# Patient Record
Sex: FEMALE | Race: WHITE | NOT HISPANIC OR LATINO | URBAN - METROPOLITAN AREA
[De-identification: names, ages, dates, MRNs, and addresses within clinical notes are randomized per-mention and may not be internally consistent; named-entity substitution may affect disease eponyms.]

---

## 2023-03-17 LAB
ALANINE AMINOTRANSFERASE (SGPT) (U/L) IN SER/PLAS: 13 U/L (ref 7–45)
ALBUMIN (G/DL) IN SER/PLAS: 4.5 G/DL (ref 3.4–5)
ALKALINE PHOSPHATASE (U/L) IN SER/PLAS: 105 U/L (ref 33–110)
ANION GAP IN SER/PLAS: 13 MMOL/L (ref 10–20)
ASPARTATE AMINOTRANSFERASE (SGOT) (U/L) IN SER/PLAS: 19 U/L (ref 9–39)
BILIRUBIN TOTAL (MG/DL) IN SER/PLAS: 0.6 MG/DL (ref 0–1.2)
CALCIDIOL (25 OH VITAMIN D3) (NG/ML) IN SER/PLAS: 50 NG/ML
CALCIUM (MG/DL) IN SER/PLAS: 10.1 MG/DL (ref 8.6–10.3)
CARBON DIOXIDE, TOTAL (MMOL/L) IN SER/PLAS: 29 MMOL/L (ref 21–32)
CHLORIDE (MMOL/L) IN SER/PLAS: 101 MMOL/L (ref 98–107)
CHOLESTEROL (MG/DL) IN SER/PLAS: 184 MG/DL (ref 0–199)
CHOLESTEROL IN HDL (MG/DL) IN SER/PLAS: 61 MG/DL
CHOLESTEROL/HDL RATIO: 3
COBALAMIN (VITAMIN B12) (PG/ML) IN SER/PLAS: 1347 PG/ML (ref 211–911)
CREATININE (MG/DL) IN SER/PLAS: 1.05 MG/DL (ref 0.5–1.05)
ERYTHROCYTE DISTRIBUTION WIDTH (RATIO) BY AUTOMATED COUNT: 11.7 % (ref 11.5–14.5)
ERYTHROCYTE MEAN CORPUSCULAR HEMOGLOBIN CONCENTRATION (G/DL) BY AUTOMATED: 33.8 G/DL (ref 32–36)
ERYTHROCYTE MEAN CORPUSCULAR VOLUME (FL) BY AUTOMATED COUNT: 96 FL (ref 80–100)
ERYTHROCYTES (10*6/UL) IN BLOOD BY AUTOMATED COUNT: 4.49 X10E12/L (ref 4–5.2)
GFR FEMALE: 63 ML/MIN/1.73M2
GLUCOSE (MG/DL) IN SER/PLAS: 96 MG/DL (ref 74–99)
HEMATOCRIT (%) IN BLOOD BY AUTOMATED COUNT: 42.9 % (ref 36–46)
HEMOGLOBIN (G/DL) IN BLOOD: 14.5 G/DL (ref 12–16)
LDL: 110 MG/DL (ref 0–99)
LEUKOCYTES (10*3/UL) IN BLOOD BY AUTOMATED COUNT: 5.3 X10E9/L (ref 4.4–11.3)
PLATELETS (10*3/UL) IN BLOOD AUTOMATED COUNT: 379 X10E9/L (ref 150–450)
POTASSIUM (MMOL/L) IN SER/PLAS: 4.4 MMOL/L (ref 3.5–5.3)
PROTEIN TOTAL: 7.4 G/DL (ref 6.4–8.2)
SODIUM (MMOL/L) IN SER/PLAS: 139 MMOL/L (ref 136–145)
TRIGLYCERIDE (MG/DL) IN SER/PLAS: 64 MG/DL (ref 0–149)
UREA NITROGEN (MG/DL) IN SER/PLAS: 18 MG/DL (ref 6–23)
VLDL: 13 MG/DL (ref 0–40)

## 2023-03-23 ENCOUNTER — TELEPHONE (OUTPATIENT)
Dept: PRIMARY CARE | Facility: CLINIC | Age: 53
End: 2023-03-23
Payer: COMMERCIAL

## 2023-03-23 NOTE — TELEPHONE ENCOUNTER
Pt was given results and verbalized understanding.     She questions why her b12 is so high, she has  not taken any supplements and she does not eat meat.

## 2023-03-23 NOTE — TELEPHONE ENCOUNTER
----- Message from Pito Lindsay DO sent at 3/21/2023  7:51 AM EDT -----  Please notify patient of high B12 of 1347!  Normal high is 911.  Would recommend stopping any B12 supplement for now.  All the rest of the labs are good.  Recheck in 1 year.    ----- Message -----  From: [facility] - Lab Results In  Sent: 3/17/2023   4:48 PM EDT  To: Pito Lindsay DO

## 2023-03-24 NOTE — TELEPHONE ENCOUNTER
I spoke with patient this afternoon and it turns out that she was taking B12 supplements 10,000 units SL daily.  She will stop these for now and we will see how she does.

## 2023-04-21 DIAGNOSIS — F17.210 CIGARETTE SMOKER: Primary | ICD-10-CM

## 2023-04-24 PROBLEM — F17.210 CIGARETTE SMOKER: Status: ACTIVE | Noted: 2023-04-24

## 2023-04-24 PROBLEM — R92.8 ABNORMALITY OF LEFT BREAST ON SCREENING MAMMOGRAM: Status: ACTIVE | Noted: 2023-04-24

## 2023-04-24 PROBLEM — Z59.89 DOES NOT HAVE HEALTH INSURANCE: Status: ACTIVE | Noted: 2023-04-24

## 2023-04-24 PROBLEM — J02.9 SORE THROAT: Status: ACTIVE | Noted: 2023-04-24

## 2023-04-24 PROBLEM — F32.9 MAJOR DEPRESSION: Status: ACTIVE | Noted: 2023-04-24

## 2023-04-24 PROBLEM — R41.3 MEMORY LOSS: Status: ACTIVE | Noted: 2023-04-24

## 2023-04-24 PROBLEM — L40.9 PSORIASIS: Status: ACTIVE | Noted: 2023-04-24

## 2023-04-24 PROBLEM — E53.8 VITAMIN B12 DEFICIENCY: Status: ACTIVE | Noted: 2023-04-24

## 2023-04-24 PROBLEM — Z59.71 DOES NOT HAVE HEALTH INSURANCE: Status: ACTIVE | Noted: 2023-04-24

## 2023-04-24 RX ORDER — BUPROPION HYDROCHLORIDE 150 MG/1
1 TABLET, EXTENDED RELEASE ORAL 2 TIMES DAILY
COMMUNITY
Start: 2020-07-23 | End: 2023-04-24 | Stop reason: SDUPTHER

## 2023-04-24 RX ORDER — APREMILAST 30 MG/1
1 TABLET, FILM COATED ORAL 2 TIMES DAILY
COMMUNITY

## 2023-04-24 RX ORDER — LANOLIN ALCOHOL/MO/W.PET/CERES
CREAM (GRAM) TOPICAL
COMMUNITY
End: 2023-11-17 | Stop reason: ALTCHOICE

## 2023-04-24 RX ORDER — CHOLECALCIFEROL (VITAMIN D3) 25 MCG
1 TABLET ORAL DAILY
COMMUNITY
End: 2023-11-17 | Stop reason: ALTCHOICE

## 2023-04-24 NOTE — TELEPHONE ENCOUNTER
Requested Prescriptions     Pending Prescriptions Disp Refills    buPROPion SR (Wellbutrin SR) 150 mg 12 hr tablet 180 tablet 0     Sig: Take 1 tablet (150 mg) by mouth in the morning and 1 tablet (150 mg) before bedtime.

## 2023-04-25 RX ORDER — BUPROPION HYDROCHLORIDE 150 MG/1
150 TABLET, EXTENDED RELEASE ORAL 2 TIMES DAILY
Qty: 180 TABLET | Refills: 0 | Status: SHIPPED | OUTPATIENT
Start: 2023-04-25 | End: 2023-05-12 | Stop reason: SDUPTHER

## 2023-05-12 ENCOUNTER — OFFICE VISIT (OUTPATIENT)
Dept: PRIMARY CARE | Facility: CLINIC | Age: 53
End: 2023-05-12
Payer: COMMERCIAL

## 2023-05-12 VITALS
OXYGEN SATURATION: 97 % | BODY MASS INDEX: 23.24 KG/M2 | DIASTOLIC BLOOD PRESSURE: 89 MMHG | WEIGHT: 123 LBS | TEMPERATURE: 98 F | HEART RATE: 95 BPM | RESPIRATION RATE: 16 BRPM | SYSTOLIC BLOOD PRESSURE: 129 MMHG

## 2023-05-12 DIAGNOSIS — Z12.11 SCREENING FOR COLON CANCER: Primary | ICD-10-CM

## 2023-05-12 DIAGNOSIS — F17.210 CIGARETTE SMOKER: ICD-10-CM

## 2023-05-12 PROCEDURE — 99214 OFFICE O/P EST MOD 30 MIN: CPT | Performed by: FAMILY MEDICINE

## 2023-05-12 RX ORDER — BUPROPION HYDROCHLORIDE 150 MG/1
150 TABLET, EXTENDED RELEASE ORAL 2 TIMES DAILY
Qty: 180 TABLET | Refills: 1 | Status: SHIPPED | OUTPATIENT
Start: 2023-05-12 | End: 2023-11-17 | Stop reason: SDUPTHER

## 2023-05-12 NOTE — PROGRESS NOTES
Subjective   Patient ID: Dulce Maria Beltre is a 53 y.o. female who presents for Med Refill (No complaints).    HPI   Patient comes in today for checkup and follow-up on her medications.  She was in the ER on 4/30/2023 complaining of back pain.  She also has some bloating, lightheadedness and weakness.  She had lab work, chest x-ray and CT of the abdomen and pelvis all of which was negative.  She was discharged home and instructed to follow-up here.  Since she was in the ER her symptoms have improved.  She currently is without complaints.        2/24/2023  Patient comes in today for checkup and refill of medication. She currently is without complaints. She is taking her medicines as directed and is not having any side effects from them.    4/28/2022  Patient presents today for 6-month checkup and refill of meds. She states that she is taking her medicines as directed and is not having any side effects from them. She is currently complaining of memory loss. 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 She does not understand why. She claims that she puts them in but they are not showing up. They did get new software at the dental office but she is not sure if it is her air or the software error. She is concerned however because her employer brought it to her attention.    10/25/2021  Patient presents today for 6-month checkup and refill of meds. She currently is without complaints. She states that she is taking her antidepressant medicines as directed and is not having any side effects from them. She is currently working and hopes to have health insurance by December.    4/16/2021  Patient is seen today as a telemedicine visit rendered via realtime interactive audio/video [employer] services as we are currently in the middle of a coronavirus pandemic worldwide. The patient was informed about the telehealth clinical encounter including  benefits to avoiding travel and limitations to the assessment. In person care may be recommended if needed. Telehealth sessions are not being recorded and personal health information is protected.    Patient presents today with a sore throat that she has had for the past 3 days. Nothing seems to make it better and nothing seems to make it worse. She also has a swollen gland on the right side of her neck. She denies any other symptoms. She denies fever, cough, sinus congestion or drainage, fatigue, muscle aches or pains, diarrhea etc.    Patient also needs a refill of her Wellbutrin medication. It is working very well for her not only for the depression but also she has almost stopped smoking and is down to 1 to 2 cigarettes a day.    Also she has been told by her work that she needs to have a hepatitis B titer drawn to find out if she is still immune. Order will be placed in the system.    2020  Patient presents today for checkup and refill of meds. She currently is without complaints. She states that she is taking her medicines as directed and is not having any side effects from them. The Wellbutrin is working well at its current dosage. She would like to continue.     2020  Patient comes in today as a returning patient to the practice. She is returning after getting a divorce and moving to [place] about 10 years ago. Her 18-year-old son was killed in an MVA 2019. He was the  of the car and his passenger survived. Patient has 2 daughters as well. Both of her parents have  in the last few years. She just moved back up to the area and currently has no health insurance. She is still grieving and would like to get on an antidepressant. She has been on Wellbutrin in the past 150 mg and felt it worked well for her without side effects. She would like to try it again.    Review of Systems   All other systems reviewed and are negative.      Objective   /89   Pulse 95   Temp 36.7 °C (98  °F)   Resp 16   Wt 55.8 kg (123 lb)   LMP  (LMP Unknown)   SpO2 97%   BMI 23.24 kg/m²     Physical Exam  Vitals reviewed.   Constitutional:       Appearance: She is well-developed.   HENT:      Head: Normocephalic and atraumatic.      Right Ear: Tympanic membrane, ear canal and external ear normal.      Left Ear: Tympanic membrane, ear canal and external ear normal.      Nose: Nose normal.      Mouth/Throat:      Mouth: Mucous membranes are moist.      Pharynx: Oropharynx is clear.   Eyes:      Extraocular Movements: Extraocular movements intact.      Conjunctiva/sclera: Conjunctivae normal.      Pupils: Pupils are equal, round, and reactive to light.   Cardiovascular:      Rate and Rhythm: Normal rate and regular rhythm.      Heart sounds: Normal heart sounds. No murmur heard.  Pulmonary:      Effort: Pulmonary effort is normal.      Breath sounds: Normal breath sounds. No wheezing.   Abdominal:      General: Abdomen is flat. Bowel sounds are normal.      Palpations: Abdomen is soft.   Musculoskeletal:         General: Normal range of motion.   Skin:     General: Skin is warm and dry.   Neurological:      General: No focal deficit present.      Mental Status: She is alert and oriented to person, place, and time. Mental status is at baseline.   Psychiatric:         Mood and Affect: Mood normal.         Behavior: Behavior normal.         Thought Content: Thought content normal.         Judgment: Judgment normal.         Assessment/Plan   Problem List Items Addressed This Visit       Cigarette smoker    Relevant Medications    buPROPion SR (Wellbutrin SR) 150 mg 12 hr tablet     Other Visit Diagnoses       Screening for colon cancer    -  Primary    Relevant Orders    Cologuard® colon cancer screening        ER notes reviewed during today's visit.  Continue current meds as directed.  Follow up in 6 months for recheck if all remains stable, sooner if problems arise.  Will contact patient with lab results when  available.  Medication list reconciled.  Thank you for visiting today!      Professional services: Some of this note was completed using Dragon voice technology and sometimes the software misinterprets words. This may include unintended errors with respect to translation of words, typographical errors or grammar errors which may not have been identified prior to finalization of the chart note. Please take this into account when reading the note. Thank you.

## 2023-09-05 DIAGNOSIS — F32.9 MAJOR DEPRESSIVE DISORDER, REMISSION STATUS UNSPECIFIED, UNSPECIFIED WHETHER RECURRENT: Primary | ICD-10-CM

## 2023-09-05 RX ORDER — BUPROPION HYDROCHLORIDE 150 MG/1
150 TABLET, FILM COATED, EXTENDED RELEASE ORAL 2 TIMES DAILY
Qty: 180 TABLET | Refills: 1 | Status: SHIPPED | OUTPATIENT
Start: 2023-09-05 | End: 2023-09-06 | Stop reason: SDUPTHER

## 2023-09-05 NOTE — TELEPHONE ENCOUNTER
Requested Prescriptions     Pending Prescriptions Disp Refills    buPROPion (Zyban) 150 mg 12 hr tablet [Pharmacy Med Name: BUPROPION HCL  MG TABLET] 180 tablet 1     Sig: Take 1 tablet (150 mg) by mouth 2 times a day.

## 2023-09-06 DIAGNOSIS — F32.9 MAJOR DEPRESSIVE DISORDER, REMISSION STATUS UNSPECIFIED, UNSPECIFIED WHETHER RECURRENT: ICD-10-CM

## 2023-09-06 RX ORDER — BUPROPION HYDROCHLORIDE 150 MG/1
150 TABLET, FILM COATED, EXTENDED RELEASE ORAL 2 TIMES DAILY
Qty: 180 TABLET | Refills: 1 | Status: SHIPPED | OUTPATIENT
Start: 2023-09-06 | End: 2023-11-17 | Stop reason: SDUPTHER

## 2023-10-27 PROBLEM — N95.0 POSTMENOPAUSAL BLEEDING: Status: RESOLVED | Noted: 2019-02-01 | Resolved: 2023-10-27

## 2023-10-27 PROBLEM — L40.3 PALMOPLANTAR PUSTULAR PSORIASIS: Status: RESOLVED | Noted: 2019-02-01 | Resolved: 2023-10-27

## 2023-10-27 PROBLEM — R10.9 FLANK PAIN: Status: RESOLVED | Noted: 2023-10-27 | Resolved: 2023-10-27

## 2023-10-27 PROBLEM — M79.672 ACUTE PAIN OF LEFT FOOT: Status: RESOLVED | Noted: 2022-07-07 | Resolved: 2023-10-27

## 2023-11-09 ENCOUNTER — HOSPITAL ENCOUNTER (EMERGENCY)
Facility: HOSPITAL | Age: 53
Discharge: HOME | End: 2023-11-09
Payer: COMMERCIAL

## 2023-11-09 VITALS
SYSTOLIC BLOOD PRESSURE: 144 MMHG | WEIGHT: 127 LBS | HEART RATE: 77 BPM | DIASTOLIC BLOOD PRESSURE: 96 MMHG | BODY MASS INDEX: 23.98 KG/M2 | TEMPERATURE: 97.5 F | RESPIRATION RATE: 18 BRPM | OXYGEN SATURATION: 98 % | HEIGHT: 61 IN

## 2023-11-09 DIAGNOSIS — L03.119 CELLULITIS OF UPPER EXTREMITY, UNSPECIFIED LATERALITY: ICD-10-CM

## 2023-11-09 DIAGNOSIS — L40.9 PSORIASIS: Primary | ICD-10-CM

## 2023-11-09 PROCEDURE — 99284 EMERGENCY DEPT VISIT MOD MDM: CPT | Performed by: PHYSICIAN ASSISTANT

## 2023-11-09 PROCEDURE — 99283 EMERGENCY DEPT VISIT LOW MDM: CPT

## 2023-11-09 PROCEDURE — 99285 EMERGENCY DEPT VISIT HI MDM: CPT

## 2023-11-09 RX ORDER — DOXYCYCLINE 100 MG/1
100 CAPSULE ORAL 2 TIMES DAILY
Qty: 20 CAPSULE | Refills: 0 | Status: SHIPPED | OUTPATIENT
Start: 2023-11-09 | End: 2023-11-19

## 2023-11-09 ASSESSMENT — COLUMBIA-SUICIDE SEVERITY RATING SCALE - C-SSRS
1. IN THE PAST MONTH, HAVE YOU WISHED YOU WERE DEAD OR WISHED YOU COULD GO TO SLEEP AND NOT WAKE UP?: NO
6. HAVE YOU EVER DONE ANYTHING, STARTED TO DO ANYTHING, OR PREPARED TO DO ANYTHING TO END YOUR LIFE?: NO
2. HAVE YOU ACTUALLY HAD ANY THOUGHTS OF KILLING YOURSELF?: NO

## 2023-11-09 ASSESSMENT — PAIN SCALES - GENERAL: PAINLEVEL_OUTOF10: 4

## 2023-11-09 ASSESSMENT — PAIN DESCRIPTION - PAIN TYPE: TYPE: ACUTE PAIN

## 2023-11-09 ASSESSMENT — PAIN - FUNCTIONAL ASSESSMENT: PAIN_FUNCTIONAL_ASSESSMENT: 0-10

## 2023-11-09 ASSESSMENT — PAIN DESCRIPTION - LOCATION: LOCATION: HAND

## 2023-11-09 NOTE — ED PROVIDER NOTES
HPI   Chief Complaint   Patient presents with    Wound Check     BL palms. Pt reports thinks she has staph. Reports hx of psorisis.          Patient is a 53-year-old female who presents today for evaluation of a rash, she has a history of psoriasis, states in the past when her rash started out like this and actually spread up her arms very quickly and they actually thought she had Lyons-Jeison's, it ended up being a MRSA infection.  Patient states that she first noticed a spot on her hand yesterday, she states has been scratching it because it is so itchy but states that today she started noticing another spot on her right hand and gradually spreading redness.  She states it is now becoming painful which is why she believes it is infected.  Denies any fevers, chills, states she wanted to catch it early before it developed into the severe infection it was last time.  States that the time that this is happened she was prescribed doxycycline which always helps.                          No data recorded                Patient History   Past Medical History:   Diagnosis Date    Acute pain of left foot 2022    Flank pain 10/27/2023    Palmoplantar pustular psoriasis 2019    Personal history of other mental and behavioral disorders     History of depression    Postmenopausal bleeding 2019     Past Surgical History:   Procedure Laterality Date    OTHER SURGICAL HISTORY  2020     section     No family history on file.  Social History     Tobacco Use    Smoking status: Former     Types: Cigarettes     Passive exposure: Past    Smokeless tobacco: Never   Substance Use Topics    Alcohol use: Not Currently    Drug use: Not on file       Physical Exam   ED Triage Vitals [23 1024]   Temp Heart Rate Resp BP   36.4 °C (97.5 °F) 77 18 (!) 144/96      SpO2 Temp Source Heart Rate Source Patient Position   98 % Temporal Monitor Sitting      BP Location FiO2 (%)     Left arm --       Physical  Exam  Vitals and nursing note reviewed.   Constitutional:       General: She is not in acute distress.     Appearance: Normal appearance. She is not toxic-appearing.   HENT:      Head: Normocephalic and atraumatic.      Nose: Nose normal.   Eyes:      Extraocular Movements: Extraocular movements intact.   Cardiovascular:      Rate and Rhythm: Normal rate and regular rhythm.   Pulmonary:      Effort: Pulmonary effort is normal.   Abdominal:      Palpations: Abdomen is soft.   Musculoskeletal:         General: Normal range of motion.      Cervical back: Normal range of motion and neck supple.   Skin:     General: Skin is warm and dry.      Comments:   Dry skin noted to bilateral palms, patient with multiple papules to right hand and left with surrounding erythema consistent with cellulitis.  No disclamation of the skin or Nikolsky sign.     Neurological:      General: No focal deficit present.      Mental Status: She is alert.   Psychiatric:         Mood and Affect: Mood normal.         Thought Content: Thought content normal.         ED Course & MDM   Diagnoses as of 11/09/23 1307   Psoriasis   Cellulitis of upper extremity, unspecified laterality       Medical Decision Making    MDM: Patient is a 53-year-old female who presents today for what she believes is a developing MRSA infection, she has had this in the past, in the past it is gotten very severe and so she likes to catch it early, she states that this is the same presentation she had last time, states that she benefits from doxycycline.  It is unusual that it is bilateral however given that she does have breaks in the skin from her psoriasis, very likely that she could have developed an infection I will treat her accordingly with doxycycline, she is encouraged to return with new or worsening symptoms, she has no systemic symptoms, no desquamation of the skin, no concern for any acute emergent process requiring further work-up at this  time.            Procedure  Procedures     Sondra Tavares PA-C  11/09/23 [id number]

## 2023-11-17 ENCOUNTER — OFFICE VISIT (OUTPATIENT)
Dept: PRIMARY CARE | Facility: CLINIC | Age: 53
End: 2023-11-17
Payer: COMMERCIAL

## 2023-11-17 VITALS
HEIGHT: 61 IN | WEIGHT: 126 LBS | DIASTOLIC BLOOD PRESSURE: 68 MMHG | TEMPERATURE: 97.2 F | HEART RATE: 96 BPM | BODY MASS INDEX: 23.79 KG/M2 | RESPIRATION RATE: 20 BRPM | SYSTOLIC BLOOD PRESSURE: 118 MMHG

## 2023-11-17 DIAGNOSIS — F32.9 MAJOR DEPRESSIVE DISORDER, REMISSION STATUS UNSPECIFIED, UNSPECIFIED WHETHER RECURRENT: Primary | ICD-10-CM

## 2023-11-17 PROCEDURE — 99213 OFFICE O/P EST LOW 20 MIN: CPT | Performed by: FAMILY MEDICINE

## 2023-11-17 RX ORDER — BUPROPION HYDROCHLORIDE 150 MG/1
150 TABLET, EXTENDED RELEASE ORAL 2 TIMES DAILY
Qty: 60 TABLET | Refills: 5 | Status: SHIPPED | OUTPATIENT
Start: 2023-11-17 | End: 2024-05-30

## 2023-11-17 RX ORDER — BUPROPION HYDROCHLORIDE 150 MG/1
150 TABLET, EXTENDED RELEASE ORAL 2 TIMES DAILY
Qty: 180 TABLET | Refills: 1 | Status: SHIPPED | OUTPATIENT
Start: 2023-11-17 | End: 2023-11-17 | Stop reason: SDUPTHER

## 2023-11-17 ASSESSMENT — ENCOUNTER SYMPTOMS
SLEEP DISTURBANCE: 1
NERVOUS/ANXIOUS: 1
FEVER: 0
FATIGUE: 0
NAUSEA: 0
VOMITING: 0
COUGH: 0
DYSPHORIC MOOD: 1
SHORTNESS OF BREATH: 0
WHEEZING: 0
DIARRHEA: 0

## 2023-11-17 NOTE — ASSESSMENT & PLAN NOTE
Pt on bupropion , works well without side effects  Has been stable  Rf med today  F/up 6 mo with pcp

## 2023-11-17 NOTE — PROGRESS NOTES
"Subjective   Patient ID: Dulce Maria Beltre is a 53 y.o. female who presents for Follow-up (6 month med check (MJ pt) Buproption RF pending for signature to be sent. ).    HPI     Review of Systems   Constitutional:  Negative for fatigue and fever.   Respiratory:  Negative for cough, shortness of breath and wheezing.         Pt quit smoking last yr   Gastrointestinal:  Negative for diarrhea, nausea and vomiting.   Psychiatric/Behavioral:  Positive for dysphoric mood and sleep disturbance. Negative for suicidal ideas. The patient is nervous/anxious.         Pt on bupropion for depression now. works well without side effects. Depression has been stable       Objective   /68   Pulse 96   Temp 36.2 °C (97.2 °F)   Resp 20   Ht 1.549 m (5' 1\")   Wt 57.2 kg (126 lb)   LMP  (LMP Unknown)   BMI 23.81 kg/m²     Physical Exam  Vitals and nursing note reviewed.   Constitutional:       General: She is not in acute distress.     Appearance: Normal appearance.   HENT:      Head: Normocephalic and atraumatic.   Cardiovascular:      Rate and Rhythm: Normal rate and regular rhythm.      Heart sounds: Normal heart sounds.   Pulmonary:      Effort: Pulmonary effort is normal.      Breath sounds: Normal breath sounds.   Neurological:      Mental Status: She is alert.   Psychiatric:         Mood and Affect: Mood normal.         Behavior: Behavior normal.         Assessment/Plan   Problem List Items Addressed This Visit             ICD-10-CM    Major depression - Primary F32.9     Pt on bupropion , works well without side effects  Has been stable  Rf med today  F/up 6 mo with pcp         Relevant Medications    buPROPion SR (Wellbutrin SR) 150 mg 12 hr tablet          "

## 2024-05-02 ENCOUNTER — APPOINTMENT (OUTPATIENT)
Dept: OBSTETRICS AND GYNECOLOGY | Facility: CLINIC | Age: 54
End: 2024-05-02
Payer: COMMERCIAL

## 2024-05-10 ENCOUNTER — OFFICE VISIT (OUTPATIENT)
Dept: PRIMARY CARE | Facility: CLINIC | Age: 54
End: 2024-05-10
Payer: COMMERCIAL

## 2024-05-10 ENCOUNTER — LAB (OUTPATIENT)
Dept: LAB | Facility: LAB | Age: 54
End: 2024-05-10
Payer: COMMERCIAL

## 2024-05-10 VITALS
SYSTOLIC BLOOD PRESSURE: 116 MMHG | HEART RATE: 87 BPM | BODY MASS INDEX: 23.81 KG/M2 | RESPIRATION RATE: 16 BRPM | DIASTOLIC BLOOD PRESSURE: 86 MMHG | OXYGEN SATURATION: 96 % | WEIGHT: 126 LBS | TEMPERATURE: 97.3 F

## 2024-05-10 DIAGNOSIS — E53.8 VITAMIN B12 DEFICIENCY: ICD-10-CM

## 2024-05-10 DIAGNOSIS — Z13.220 LIPID SCREENING: ICD-10-CM

## 2024-05-10 DIAGNOSIS — Z00.00 ROUTINE GENERAL MEDICAL EXAMINATION AT A HEALTH CARE FACILITY: ICD-10-CM

## 2024-05-10 DIAGNOSIS — Z00.00 ROUTINE GENERAL MEDICAL EXAMINATION AT A HEALTH CARE FACILITY: Primary | ICD-10-CM

## 2024-05-10 DIAGNOSIS — E55.9 VITAMIN D DEFICIENCY: ICD-10-CM

## 2024-05-10 DIAGNOSIS — Z83.49 FAMILY HISTORY OF THYROID DISEASE: ICD-10-CM

## 2024-05-10 DIAGNOSIS — D64.9 ANEMIA, UNSPECIFIED TYPE: ICD-10-CM

## 2024-05-10 DIAGNOSIS — Z83.3 FAMILY HISTORY OF DIABETES MELLITUS (DM): ICD-10-CM

## 2024-05-10 LAB
25(OH)D3 SERPL-MCNC: 48 NG/ML (ref 30–100)
ALBUMIN SERPL BCP-MCNC: 4.4 G/DL (ref 3.4–5)
ALP SERPL-CCNC: 97 U/L (ref 33–110)
ALT SERPL W P-5'-P-CCNC: 13 U/L (ref 7–45)
ANION GAP SERPL CALC-SCNC: 11 MMOL/L (ref 10–20)
AST SERPL W P-5'-P-CCNC: 14 U/L (ref 9–39)
BILIRUB SERPL-MCNC: 0.6 MG/DL (ref 0–1.2)
BUN SERPL-MCNC: 14 MG/DL (ref 6–23)
CALCIUM SERPL-MCNC: 9.9 MG/DL (ref 8.6–10.3)
CHLORIDE SERPL-SCNC: 104 MMOL/L (ref 98–107)
CHOLEST SERPL-MCNC: 184 MG/DL (ref 0–199)
CHOLESTEROL/HDL RATIO: 2.9
CO2 SERPL-SCNC: 29 MMOL/L (ref 21–32)
CREAT SERPL-MCNC: 0.91 MG/DL (ref 0.5–1.05)
EGFRCR SERPLBLD CKD-EPI 2021: 75 ML/MIN/1.73M*2
ERYTHROCYTE [DISTWIDTH] IN BLOOD BY AUTOMATED COUNT: 11.9 % (ref 11.5–14.5)
EST. AVERAGE GLUCOSE BLD GHB EST-MCNC: 94 MG/DL
GLUCOSE SERPL-MCNC: 92 MG/DL (ref 74–99)
HBA1C MFR BLD: 4.9 %
HCT VFR BLD AUTO: 43.2 % (ref 36–46)
HDLC SERPL-MCNC: 64 MG/DL
HGB BLD-MCNC: 14.7 G/DL (ref 12–16)
IRON SATN MFR SERPL: 52 % (ref 25–45)
IRON SERPL-MCNC: 162 UG/DL (ref 35–150)
LDLC SERPL CALC-MCNC: 105 MG/DL
MCH RBC QN AUTO: 31.8 PG (ref 26–34)
MCHC RBC AUTO-ENTMCNC: 34 G/DL (ref 32–36)
MCV RBC AUTO: 94 FL (ref 80–100)
NON HDL CHOLESTEROL: 120 MG/DL (ref 0–149)
NRBC BLD-RTO: 0 /100 WBCS (ref 0–0)
PLATELET # BLD AUTO: 259 X10*3/UL (ref 150–450)
POTASSIUM SERPL-SCNC: 4.3 MMOL/L (ref 3.5–5.3)
PROT SERPL-MCNC: 6.9 G/DL (ref 6.4–8.2)
RBC # BLD AUTO: 4.62 X10*6/UL (ref 4–5.2)
SODIUM SERPL-SCNC: 140 MMOL/L (ref 136–145)
TIBC SERPL-MCNC: 313 UG/DL (ref 240–445)
TRIGL SERPL-MCNC: 73 MG/DL (ref 0–149)
TSH SERPL-ACNC: 1.04 MIU/L (ref 0.44–3.98)
UIBC SERPL-MCNC: 151 UG/DL (ref 110–370)
VIT B12 SERPL-MCNC: 316 PG/ML (ref 211–911)
VLDL: 15 MG/DL (ref 0–40)
WBC # BLD AUTO: 4.7 X10*3/UL (ref 4.4–11.3)

## 2024-05-10 PROCEDURE — 82306 VITAMIN D 25 HYDROXY: CPT

## 2024-05-10 PROCEDURE — 83036 HEMOGLOBIN GLYCOSYLATED A1C: CPT

## 2024-05-10 PROCEDURE — 80053 COMPREHEN METABOLIC PANEL: CPT

## 2024-05-10 PROCEDURE — 83540 ASSAY OF IRON: CPT

## 2024-05-10 PROCEDURE — 80061 LIPID PANEL: CPT

## 2024-05-10 PROCEDURE — 83550 IRON BINDING TEST: CPT

## 2024-05-10 PROCEDURE — 36415 COLL VENOUS BLD VENIPUNCTURE: CPT

## 2024-05-10 PROCEDURE — 84443 ASSAY THYROID STIM HORMONE: CPT

## 2024-05-10 PROCEDURE — 82607 VITAMIN B-12: CPT

## 2024-05-10 PROCEDURE — 99396 PREV VISIT EST AGE 40-64: CPT | Performed by: FAMILY MEDICINE

## 2024-05-10 PROCEDURE — 85027 COMPLETE CBC AUTOMATED: CPT

## 2024-05-10 NOTE — RESULT ENCOUNTER NOTE
David Hardy,    Your B12 level was low normal at 316.  Vitamin B-12 level should be above 400. If less than 400 can have both neurologic and/or psychological symptoms.  Would recommend over-the-counter vitamin B-12  1000 units daily to keep the level above 400. Recheck in September 2024.    All the rest your labs were great including your iron level which was actually on the high side.  So if you are taking iron pills you can stop them.    Happy Mother's Day!    Have a Great Day and Weekend!!!    Dr. Lindsay

## 2024-05-10 NOTE — PROGRESS NOTES
Subjective   Patient ID: Dulce Maria Beltre is a 54 y.o. female who presents for Follow-up (6 mo med fu. No questions, concerns, or complaints. /Fasting for labs, was due in March. /) and Throat Problem (Intermittent, when swallowing and her head is turned, she hears and feels a popping. Noticed a few weeks ago. ).  HPI  Patient comes in today for physical exam.  She currently is without complaints.    Review of Systems   All other systems reviewed and are negative.      Objective   Vitals:  /86   Pulse 87   Temp 36.3 °C (97.3 °F)   Resp 16   Wt 57.2 kg (126 lb)   LMP  (LMP Unknown)   SpO2 96%   BMI 23.81 kg/m²     Physical Exam  Vitals reviewed.   Constitutional:       Appearance: She is well-developed.   HENT:      Head: Normocephalic and atraumatic.      Right Ear: Tympanic membrane, ear canal and external ear normal.      Left Ear: Tympanic membrane, ear canal and external ear normal.      Nose: Nose normal.      Mouth/Throat:      Mouth: Mucous membranes are moist.      Pharynx: Oropharynx is clear.   Eyes:      Extraocular Movements: Extraocular movements intact.      Conjunctiva/sclera: Conjunctivae normal.      Pupils: Pupils are equal, round, and reactive to light.   Cardiovascular:      Rate and Rhythm: Normal rate and regular rhythm.      Heart sounds: Normal heart sounds. No murmur heard.  Pulmonary:      Effort: Pulmonary effort is normal.      Breath sounds: Normal breath sounds. No wheezing.   Abdominal:      General: Abdomen is flat. Bowel sounds are normal.      Palpations: Abdomen is soft.   Musculoskeletal:         General: Normal range of motion.   Skin:     General: Skin is warm and dry.   Neurological:      General: No focal deficit present.      Mental Status: She is alert and oriented to person, place, and time. Mental status is at baseline.   Psychiatric:         Mood and Affect: Mood normal.         Behavior: Behavior normal.         Thought Content: Thought content normal.          Judgment: Judgment normal.       Assessment/Plan   Problem List Items Addressed This Visit       Vitamin B12 deficiency    Relevant Orders    CBC (Completed)    Vitamin B12 (Completed)     Other Visit Diagnoses       Routine general medical examination at a health care facility    -  Primary    Relevant Orders    Comprehensive Metabolic Panel (Completed)    Family history of diabetes mellitus (DM)        Relevant Orders    Hemoglobin A1C (Completed)    Vitamin D deficiency        Relevant Orders    Vitamin D 25-Hydroxy,Total (for eval of Vitamin D levels) (Completed)    Lipid screening        Relevant Orders    Lipid Panel (Completed)    Family history of thyroid disease        Relevant Orders    TSH with reflex to Free T4 if abnormal (Completed)    Anemia, unspecified type        Relevant Orders    Iron and TIBC (Completed)        Will contact patient with lab results when available.  Patient to have the Cologuard test at her house last year but she found the process not to her liking and thus never did the test.  She will think about doing a colonoscopy.  She claims she is up-to-date with her Pap and mammograms.   Medication list reconciled.  Thank you for visiting today!      Professional services: Some of this note was completed using Dragon voice technology and sometimes the software misinterprets words. This may include unintended errors with respect to translation of words, typographical errors or grammar errors which may not have been identified prior to finalization of the chart note. Please take this into account when reading the note. Thank you.            Pito Lindsay DO 05/11/24 10:15 AM

## 2024-05-17 ENCOUNTER — TELEPHONE (OUTPATIENT)
Dept: PRIMARY CARE | Facility: CLINIC | Age: 54
End: 2024-05-17
Payer: COMMERCIAL

## 2024-05-17 DIAGNOSIS — R79.0 ABNORMAL SERUM IRON LEVEL: Primary | ICD-10-CM

## 2024-05-17 NOTE — TELEPHONE ENCOUNTER
We will refer patient to hematology for further evaluation of her iron.  I placed an order for the referral.  Thank you.

## 2024-05-17 NOTE — TELEPHONE ENCOUNTER
Pt states she does not eat red meat and does not take iron pills, she is unsure why else her iron level would be elevated.

## 2024-05-20 DIAGNOSIS — Z12.11 COLON CANCER SCREENING: ICD-10-CM

## 2024-05-24 PROBLEM — Z12.11 COLON CANCER SCREENING: Status: ACTIVE | Noted: 2024-05-24

## 2024-05-28 DIAGNOSIS — Z12.11 COLON CANCER SCREENING: ICD-10-CM

## 2024-05-30 ENCOUNTER — OFFICE VISIT (OUTPATIENT)
Dept: OBSTETRICS AND GYNECOLOGY | Facility: CLINIC | Age: 54
End: 2024-05-30
Payer: COMMERCIAL

## 2024-05-30 VITALS
SYSTOLIC BLOOD PRESSURE: 126 MMHG | DIASTOLIC BLOOD PRESSURE: 82 MMHG | HEIGHT: 61 IN | BODY MASS INDEX: 23.88 KG/M2 | WEIGHT: 126.5 LBS

## 2024-05-30 DIAGNOSIS — Z86.19 HISTORY OF HERPES SIMPLEX INFECTION: ICD-10-CM

## 2024-05-30 DIAGNOSIS — Z12.31 VISIT FOR SCREENING MAMMOGRAM: ICD-10-CM

## 2024-05-30 DIAGNOSIS — R92.333 HETEROGENEOUSLY DENSE TISSUE OF BOTH BREASTS ON MAMMOGRAPHY: ICD-10-CM

## 2024-05-30 DIAGNOSIS — Z01.419 WELL WOMAN EXAM WITH ROUTINE GYNECOLOGICAL EXAM: Primary | ICD-10-CM

## 2024-05-30 PROCEDURE — 99386 PREV VISIT NEW AGE 40-64: CPT

## 2024-05-30 PROCEDURE — 1036F TOBACCO NON-USER: CPT

## 2024-05-30 RX ORDER — ACYCLOVIR 50 MG/G
1 OINTMENT TOPICAL
COMMUNITY
End: 2024-05-30 | Stop reason: SDUPTHER

## 2024-05-30 RX ORDER — ACYCLOVIR 50 MG/G
1 OINTMENT TOPICAL
Qty: 30 G | Refills: 3 | Status: SHIPPED | OUTPATIENT
Start: 2024-05-30

## 2024-05-30 RX ORDER — SODIUM, POTASSIUM,MAG SULFATES 17.5-3.13G
SOLUTION, RECONSTITUTED, ORAL ORAL
Qty: 1 EACH | Refills: 0 | Status: SHIPPED | OUTPATIENT
Start: 2024-05-30

## 2024-05-30 RX ORDER — ERGOCALCIFEROL 1.25 MG/1
1.25 CAPSULE ORAL
COMMUNITY

## 2024-05-30 ASSESSMENT — ENCOUNTER SYMPTOMS
OCCASIONAL FEELINGS OF UNSTEADINESS: 0
LOSS OF SENSATION IN FEET: 0
DEPRESSION: 0

## 2024-05-30 ASSESSMENT — PATIENT HEALTH QUESTIONNAIRE - PHQ9
2. FEELING DOWN, DEPRESSED OR HOPELESS: NOT AT ALL
1. LITTLE INTEREST OR PLEASURE IN DOING THINGS: NOT AT ALL
SUM OF ALL RESPONSES TO PHQ9 QUESTIONS 1 & 2: 0

## 2024-05-30 NOTE — PROGRESS NOTES
"Subjective   Dulce Maria Beltre is a 54 y.o. female who is here for a NPV exam.     GYN & Sexual Hx.:   - Last pap: 2021 Normal, HPV neg.  - History of abnormal Pap smear: yes - Atypical cells, history of colposcopy () per patient, though normal and normal pap results to follow per patient.  - Contraception: None  - Menstrual Periods: No periods since 4821-7274.    Concern for vaginal dryness and decreased libido.  Interested in medication to help to increase her libido.     Hx of HSV, requests refill of acyclovir ointment.     OB Hx.:       Regular self breast exam: yes  Last mammogram: 2022 with U/S, Cat. 2.  Family history of breast cancer: no    Living situation: alone, Occupation:  Orthodontic assistant , Tobacco/alcohol/caffeine: No alcohol use, occasional nicotine use, though rare per patient, and Illicit drugs: no history of illicit drug use    Diet: general  Exercise: irregularly- walking, weights.    Review of Systems   All other systems reviewed and are negative.      Objective   /82   Ht 1.549 m (5' 1\")   Wt 57.4 kg (126 lb 8 oz)   LMP  (LMP Unknown)   BMI 23.90 kg/m²      General:   alert and oriented, in no acute distress           Lungs: Normal respiratory effort.   Breasts: Soft, symmetric, no skin dimpling or nipple discharge, no palpable masses or axillary nodes.   Abdomen: soft, non-tender, without masses or organomegaly   Vulva: normal, Bartholin's, Urethra, [place]'s normal   Vagina: Atrophy               Neuro: age-appropriate affect, behavior and speech, no gross motor deficits, normal gait     Assessment      54 y.o.  woman for annual GYN exam.     - Health Maintenance --> Routine follow up with PCP for health maintenance examination encouraged, including TSH, cholesterol, and Vit. D evaluation.  Self breast exam encouraged; concerning characteristics of breasts reviewed - Pt. will report general concerns, any adherent lumps, skin dimpling/puckering or color " changes, and any nipple discharge.  Order for mammogram placed and patient advised to schedule.  Diet and exercise reviewed.   Pap due 2026 - Reviewed ACOG and ASCCP guidelines with pt.      - Vaginal Dryness -- Discussed use of Replens vaginal moisturizor 3-4 x/ week.     - Decreased Libido -- Referral to [facility].     - F/U 1 year or as needed.    Olivia Diaz, OCTAVIO-ARONM

## 2024-06-11 ENCOUNTER — ANESTHESIA EVENT (OUTPATIENT)
Dept: GASTROENTEROLOGY | Facility: EXTERNAL LOCATION | Age: 54
End: 2024-06-11

## 2024-06-11 DIAGNOSIS — F32.9 MAJOR DEPRESSIVE DISORDER, REMISSION STATUS UNSPECIFIED, UNSPECIFIED WHETHER RECURRENT: ICD-10-CM

## 2024-06-11 RX ORDER — BUPROPION HYDROCHLORIDE 150 MG/1
150 TABLET, EXTENDED RELEASE ORAL 2 TIMES DAILY
Qty: 180 TABLET | Refills: 0 | Status: SHIPPED | OUTPATIENT
Start: 2024-06-11 | End: 2024-09-09

## 2024-06-14 ENCOUNTER — HOSPITAL ENCOUNTER (OUTPATIENT)
Dept: RADIOLOGY | Facility: CLINIC | Age: 54
Discharge: HOME | End: 2024-06-14
Payer: COMMERCIAL

## 2024-06-14 VITALS — BODY MASS INDEX: 23.79 KG/M2 | HEIGHT: 61 IN | WEIGHT: 126 LBS

## 2024-06-14 DIAGNOSIS — Z12.31 VISIT FOR SCREENING MAMMOGRAM: ICD-10-CM

## 2024-06-14 DIAGNOSIS — R92.333 HETEROGENEOUSLY DENSE TISSUE OF BOTH BREASTS ON MAMMOGRAPHY: ICD-10-CM

## 2024-06-14 PROCEDURE — 77063 BREAST TOMOSYNTHESIS BI: CPT

## 2024-06-21 ENCOUNTER — APPOINTMENT (OUTPATIENT)
Dept: GASTROENTEROLOGY | Facility: EXTERNAL LOCATION | Age: 54
End: 2024-06-21
Payer: COMMERCIAL

## 2024-06-21 ENCOUNTER — ANESTHESIA (OUTPATIENT)
Dept: GASTROENTEROLOGY | Facility: EXTERNAL LOCATION | Age: 54
End: 2024-06-21

## 2024-09-03 ENCOUNTER — OFFICE VISIT (OUTPATIENT)
Dept: HEMATOLOGY/ONCOLOGY | Facility: CLINIC | Age: 54
End: 2024-09-03
Payer: COMMERCIAL

## 2024-09-03 ENCOUNTER — LAB (OUTPATIENT)
Dept: LAB | Facility: CLINIC | Age: 54
End: 2024-09-03
Payer: COMMERCIAL

## 2024-09-03 VITALS
HEART RATE: 82 BPM | SYSTOLIC BLOOD PRESSURE: 131 MMHG | OXYGEN SATURATION: 97 % | RESPIRATION RATE: 16 BRPM | TEMPERATURE: 98.1 F | WEIGHT: 129.19 LBS | DIASTOLIC BLOOD PRESSURE: 86 MMHG | BODY MASS INDEX: 24.41 KG/M2

## 2024-09-03 DIAGNOSIS — R79.0 ABNORMAL SERUM IRON LEVEL: ICD-10-CM

## 2024-09-03 LAB
ALBUMIN SERPL BCP-MCNC: 4.5 G/DL (ref 3.4–5)
ALP SERPL-CCNC: 96 U/L (ref 33–110)
ALT SERPL W P-5'-P-CCNC: 14 U/L (ref 7–45)
ANION GAP SERPL CALC-SCNC: 11 MMOL/L (ref 10–20)
AST SERPL W P-5'-P-CCNC: 14 U/L (ref 9–39)
BASOPHILS # BLD AUTO: 0.03 X10*3/UL (ref 0–0.1)
BASOPHILS NFR BLD AUTO: 0.5 %
BILIRUB SERPL-MCNC: 0.3 MG/DL (ref 0–1.2)
BUN SERPL-MCNC: 13 MG/DL (ref 6–23)
CALCIUM SERPL-MCNC: 9.9 MG/DL (ref 8.6–10.3)
CHLORIDE SERPL-SCNC: 103 MMOL/L (ref 98–107)
CO2 SERPL-SCNC: 29 MMOL/L (ref 21–32)
CREAT SERPL-MCNC: 0.89 MG/DL (ref 0.5–1.05)
EGFRCR SERPLBLD CKD-EPI 2021: 77 ML/MIN/1.73M*2
EOSINOPHIL # BLD AUTO: 0.43 X10*3/UL (ref 0–0.7)
EOSINOPHIL NFR BLD AUTO: 6.6 %
ERYTHROCYTE [DISTWIDTH] IN BLOOD BY AUTOMATED COUNT: 11.7 % (ref 11.5–14.5)
FERRITIN SERPL-MCNC: 29 NG/ML (ref 8–150)
GLUCOSE SERPL-MCNC: 89 MG/DL (ref 74–99)
HCT VFR BLD AUTO: 42.3 % (ref 36–46)
HGB BLD-MCNC: 14.3 G/DL (ref 12–16)
IMM GRANULOCYTES # BLD AUTO: 0 X10*3/UL (ref 0–0.7)
IMM GRANULOCYTES NFR BLD AUTO: 0 % (ref 0–0.9)
IRON SATN MFR SERPL: 24 % (ref 25–45)
IRON SERPL-MCNC: 79 UG/DL (ref 35–150)
LYMPHOCYTES # BLD AUTO: 1.86 X10*3/UL (ref 1.2–4.8)
LYMPHOCYTES NFR BLD AUTO: 28.7 %
MCH RBC QN AUTO: 32.3 PG (ref 26–34)
MCHC RBC AUTO-ENTMCNC: 33.8 G/DL (ref 32–36)
MCV RBC AUTO: 96 FL (ref 80–100)
MONOCYTES # BLD AUTO: 0.41 X10*3/UL (ref 0.1–1)
MONOCYTES NFR BLD AUTO: 6.3 %
NEUTROPHILS # BLD AUTO: 3.74 X10*3/UL (ref 1.2–7.7)
NEUTROPHILS NFR BLD AUTO: 57.9 %
PLATELET # BLD AUTO: 248 X10*3/UL (ref 150–450)
POTASSIUM SERPL-SCNC: 4 MMOL/L (ref 3.5–5.3)
PROT SERPL-MCNC: 6.9 G/DL (ref 6.4–8.2)
RBC # BLD AUTO: 4.43 X10*6/UL (ref 4–5.2)
SODIUM SERPL-SCNC: 139 MMOL/L (ref 136–145)
TIBC SERPL-MCNC: 328 UG/DL (ref 240–445)
UIBC SERPL-MCNC: 249 UG/DL (ref 110–370)
WBC # BLD AUTO: 6.5 X10*3/UL (ref 4.4–11.3)

## 2024-09-03 PROCEDURE — 81256 HFE GENE: CPT

## 2024-09-03 PROCEDURE — 36415 COLL VENOUS BLD VENIPUNCTURE: CPT

## 2024-09-03 PROCEDURE — 85025 COMPLETE CBC W/AUTO DIFF WBC: CPT

## 2024-09-03 PROCEDURE — 82728 ASSAY OF FERRITIN: CPT

## 2024-09-03 PROCEDURE — 99214 OFFICE O/P EST MOD 30 MIN: CPT | Performed by: INTERNAL MEDICINE

## 2024-09-03 PROCEDURE — G0452 MOLECULAR PATHOLOGY INTERPR: HCPCS | Performed by: STUDENT IN AN ORGANIZED HEALTH CARE EDUCATION/TRAINING PROGRAM

## 2024-09-03 PROCEDURE — 80053 COMPREHEN METABOLIC PANEL: CPT

## 2024-09-03 PROCEDURE — 99204 OFFICE O/P NEW MOD 45 MIN: CPT | Performed by: INTERNAL MEDICINE

## 2024-09-03 PROCEDURE — 83540 ASSAY OF IRON: CPT

## 2024-09-03 ASSESSMENT — PAIN SCALES - GENERAL: PAINLEVEL: 0-NO PAIN

## 2024-09-03 NOTE — PROGRESS NOTES
Patient ID: Dulce Maria Beltre is a 54 y.o. female.  Referring Physician: Pito Lindsay DO  [address]  [address]  [place] [address]  Primary Care Provider: Pito Lindsay DO      Subjective    HPI  Ms.Judith ART Beltre  is a 53 y/o F presenting for initial consultation at the request of Dr. Lindsay for abnormal serum iron level.    Blood work on 5/10/24 shows iron saturation 52%. No recent ferritin. CBC unremarkable. No prior irons.    Patient denies being on any oral iron supplements and does not eat much meat in her diet. No family history of heart disease aside from her father that had an aortic valve rupture but this was due to heavy smoking and drinking lifestyle. She currently smokes one cigarette a day and was in her 30's smoking about a pack a day. She has had 3 pregnancies, the first being born at 35 weeks due to high blood pressure and placenta previa, the 2nd causing a vaginal tear into there cervix which was a . The 3rd with no complications. She reports some palpitations at times. She is tired. 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 She stopped having her period at age 42 or 43. No family history of cancer. Her daughters and her all have a disorder where your blood sugar drops but she is not on any treatment for this and is able to control per her self. No other major complaints at this time.     Patient's past medical history, surgical history, family history and social history reviewed.     Objective   Vitals:    24 1327   BP: 131/86   Pulse: 82   Resp: 16   Temp: 36.7 °C (98.1 °F)   SpO2: 97%       LMP  (LMP Unknown)   Review of Systems:   Review of Systems:    Positive per HPI, otherwise negative.     Physical Exam  Gen: appears well in clinic, NAD  HEENT: atraumatic head, normocephalic, EOMI, conjunctiva normal  LUNG: no increased WOB, CTAB  CV: No JVD. RRR  GI: soft, NT, ND  LE: no LE edema  Skin: no obvious rashes or lesions on visible skin  Neuro:  interactive, no focal deficits noted  Psych: normal mood and affect  Performance Status:  Symptomatic; fully ambulatory    Labs/Imaging/Pathology: personally reviewed reports and images in Epic electronic medical record system. Pertinent results as it related to the plan represented in below in assessment and plan.   Assessment/Plan    Elevated Iron Saturation  - Blood work from 5/2024 shows a one time elevated iron saturation of 55%.  - No other CBC's.  -No secondary reason for the elevated iron.  - Not taking any iron supplements.  - Does not eat a lot of meat per her preference.   - Eats a regular diet.  - We will check for hemachromatosis along with a repeat CBC, CMP, and iron studies.   - RTC in one week with phone visit.    RTC in one week with phone visit. This note has been transcribed using a medical scribe and there is a possibility of unintentional typing misprints.      Diagnoses and all orders for this visit:  Abnormal serum iron level  -     Referral to Benign Hematology  -     Hemochromatosis Mutation; Future  -     Iron and TIBC; Future  -     Ferritin; Future  -     CBC and Auto Differential; Future  -     Comprehensive Metabolic Panel; Future           Mi Bah MD  Hematology/Oncology  [facility] at [facility]    Scribe Attestation  By signing my name below, IPastora Scribe   attest that this documentation has been prepared under the direction and in the presence of Mi Bah MD.     Time Spent  Prep time on day of patient encounter: 5 minutes  Time spent directly with patient, family or caregiver: 25 minutes  Additional Time Spent on Patient Care Activities: 5 minutes  Documentation Time: 7 minutes  Other Time Spent: 0 minutes  Total: 42 minutes

## 2024-09-09 LAB
ELECTRONICALLY SIGNED BY: ABNORMAL
HFE GENE MUT TESTED BLD/T: ABNORMAL
HFE P.C282Y BLD/T QL: ABNORMAL
HFE P.H63D BLD/T QL: NORMAL

## 2024-09-10 ENCOUNTER — TELEMEDICINE (OUTPATIENT)
Dept: HEMATOLOGY/ONCOLOGY | Facility: CLINIC | Age: 54
End: 2024-09-10
Payer: COMMERCIAL

## 2024-09-10 DIAGNOSIS — R79.0 ABNORMAL SERUM IRON LEVEL: Primary | ICD-10-CM

## 2024-09-10 PROCEDURE — 99213 OFFICE O/P EST LOW 20 MIN: CPT | Performed by: INTERNAL MEDICINE

## 2024-09-10 NOTE — PROGRESS NOTES
Consent:  Verbal consent was requested and obtained from patient on this date for a telehealth visit.    Patient ID: Dulce Maria Beltre is a 54 y.o. female.    Subjective    HPI  Ms.Dulce Maria Beltre  is a 55 y/o F presenting for initial consultation at the request of Dr. Lindsay for abnormal serum iron level.     Blood work on 5/10/24 shows iron saturation 52%. No recent ferritin. CBC unremarkable. No prior irons.     Patient denies being on any oral iron supplements and does not eat much meat in her diet. No family history of heart disease aside from her father that had an aortic valve rupture but this was due to heavy smoking and drinking lifestyle. She currently smokes one cigarette a day and was in her 30's smoking about a pack a day. She has had 3 pregnancies, the first being born at 35 weeks due to high blood pressure and placenta previa, the 2nd causing a vaginal tear into there cervix which was a . The 3rd with no complications. She reports some palpitations at times. She is tired. 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 She stopped having her period at age 42 or 43. No family history of cancer. Her daughters and her all have a disorder where your blood sugar drops but she is not on any treatment for this and is able to control per her self. No other major complaints at this time.      9/10/24: A telephone visit (audio only) between the patient at home and the provider at [facility] [place] was utilized to provide this telehealth service.     Patient presents for follow-up for elevated iron saturation.  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  She denies any new symptoms no new complaints      Patient's past medical history, surgical history, family history and social history reviewed.  Performance Status:  Symptomatic; fully ambulatory    Labs/Imaging/Pathology: personally reviewed reports and images in Epic electronic  medical record system. Pertinent results as it related to the plan represented in below in assessment and plan.   Assessment/Plan    Elevated Iron Saturation  - Blood work from 5/2024 shows a one time elevated iron saturation of 55%.  - No other CBC's.  -No secondary reason for the elevated iron.  - Not taking any iron supplements.  - Does not eat a lot of meat per her preference.   - Eats a regular diet.  - We will check for hemachromatosis along with a repeat CBC, CMP, and iron studies.   - RTC in one week with phone visit.    9/10/24: Telephone visit  -Discussed that she does not have hemochromatosis but does have a gene C282Y which suggest she is a carrier and it would be useful for her 2 daughters to be aware in case they could have acquired this gene and another gene from their father which would be consistent for hemochromatosis for them  -Discussed with her that having just 1 gene in C282Y does not increase her risk of iron overload compared to the general population and no further interventions are needed  -Furthermore her most recent blood work shows normal iron saturation and ferritin  -No further hematologic workup needed at this time    RTC as needed This note has been transcribed using a medical scribe and there is a possibility of unintentional typing misprints.     Diagnoses and all orders for this visit:  Abnormal serum iron level         Mi Bah MD  Hematology/Oncology  [facility]    Scribe Attestation  By signing my name below, IPastora Scribe   attest that this documentation has been prepared under the direction and in the presence of Mi Bah MD.      20 min spent on this encounter

## 2024-09-12 ENCOUNTER — ANESTHESIA EVENT (OUTPATIENT)
Dept: GASTROENTEROLOGY | Facility: EXTERNAL LOCATION | Age: 54
End: 2024-09-12

## 2024-09-20 ENCOUNTER — ANESTHESIA (OUTPATIENT)
Dept: GASTROENTEROLOGY | Facility: EXTERNAL LOCATION | Age: 54
End: 2024-09-20

## 2024-09-20 ENCOUNTER — APPOINTMENT (OUTPATIENT)
Dept: GASTROENTEROLOGY | Facility: EXTERNAL LOCATION | Age: 54
End: 2024-09-20
Payer: COMMERCIAL

## 2024-09-20 VITALS
RESPIRATION RATE: 13 BRPM | HEART RATE: 86 BPM | TEMPERATURE: 97.9 F | HEIGHT: 61 IN | BODY MASS INDEX: 24.35 KG/M2 | OXYGEN SATURATION: 97 % | SYSTOLIC BLOOD PRESSURE: 142 MMHG | WEIGHT: 129 LBS | DIASTOLIC BLOOD PRESSURE: 87 MMHG

## 2024-09-20 DIAGNOSIS — Z12.11 COLON CANCER SCREENING: Primary | ICD-10-CM

## 2024-09-20 PROCEDURE — 45378 DIAGNOSTIC COLONOSCOPY: CPT | Performed by: INTERNAL MEDICINE

## 2024-09-20 RX ORDER — PROPOFOL 10 MG/ML
INJECTION, EMULSION INTRAVENOUS AS NEEDED
Status: DISCONTINUED | OUTPATIENT
Start: 2024-09-20 | End: 2024-09-20

## 2024-09-20 RX ORDER — LIDOCAINE HYDROCHLORIDE 20 MG/ML
INJECTION, SOLUTION INFILTRATION; PERINEURAL AS NEEDED
Status: DISCONTINUED | OUTPATIENT
Start: 2024-09-20 | End: 2024-09-20

## 2024-09-20 RX ORDER — SODIUM CHLORIDE 9 MG/ML
20 INJECTION, SOLUTION INTRAVENOUS CONTINUOUS
Status: DISCONTINUED | OUTPATIENT
Start: 2024-09-20 | End: 2024-09-21 | Stop reason: HOSPADM

## 2024-09-20 SDOH — HEALTH STABILITY: MENTAL HEALTH: CURRENT SMOKER: 1

## 2024-09-20 ASSESSMENT — PAIN SCALES - GENERAL
PAINLEVEL_OUTOF10: 0 - NO PAIN
PAINLEVEL_OUTOF10: 0 - NO PAIN
PAIN_LEVEL: 0
PAINLEVEL_OUTOF10: 0 - NO PAIN
PAINLEVEL_OUTOF10: 0 - NO PAIN

## 2024-09-20 ASSESSMENT — ENCOUNTER SYMPTOMS
DIZZINESS: 0
CHILLS: 0
ARTHRALGIAS: 0
ABDOMINAL PAIN: 0
SHORTNESS OF BREATH: 0
COLOR CHANGE: 0
JOINT SWELLING: 0
NAUSEA: 0
DIARRHEA: 0
COUGH: 0
LIGHT-HEADEDNESS: 0
SLEEP DISTURBANCE: 0
ABDOMINAL DISTENTION: 0
HEADACHES: 0
VOMITING: 0
FEVER: 0
CONFUSION: 0
UNEXPECTED WEIGHT CHANGE: 0
DIFFICULTY URINATING: 0
WHEEZING: 0
CONSTIPATION: 0
SPEECH DIFFICULTY: 0
TROUBLE SWALLOWING: 0

## 2024-09-20 ASSESSMENT — PAIN - FUNCTIONAL ASSESSMENT
PAIN_FUNCTIONAL_ASSESSMENT: 0-10

## 2024-09-20 ASSESSMENT — COLUMBIA-SUICIDE SEVERITY RATING SCALE - C-SSRS
6. HAVE YOU EVER DONE ANYTHING, STARTED TO DO ANYTHING, OR PREPARED TO DO ANYTHING TO END YOUR LIFE?: NO
1. IN THE PAST MONTH, HAVE YOU WISHED YOU WERE DEAD OR WISHED YOU COULD GO TO SLEEP AND NOT WAKE UP?: NO
2. HAVE YOU ACTUALLY HAD ANY THOUGHTS OF KILLING YOURSELF?: NO

## 2024-09-20 NOTE — H&P
History Of Present Illness  Dulce Maria Beltre is a 54 y.o. female presenting with Colon cancer screening     Past Medical History  Past Medical History:   Diagnosis Date    Acute pain of left foot 2022    Flank pain 10/27/2023    Major depression 2023    Palmoplantar pustular psoriasis 2019    Personal history of other mental and behavioral disorders     History of depression    Postmenopausal bleeding 2019     Surgical History  Past Surgical History:   Procedure Laterality Date    OTHER SURGICAL HISTORY  2020     section     Social History  She reports that she has quit smoking. Her smoking use included cigarettes. She has been exposed to tobacco smoke. She has never used smokeless tobacco. She reports that she does not currently use alcohol. She reports that she does not use drugs.    Family History  No family history on file.     Allergies  No Known Allergies  Review of Systems   Constitutional:  Negative for chills, fever and unexpected weight change.   HENT:  Negative for congestion and trouble swallowing.    Respiratory:  Negative for cough, shortness of breath and wheezing.    Cardiovascular:  Negative for chest pain.   Gastrointestinal:  Negative for abdominal distention, abdominal pain, constipation, diarrhea, nausea and vomiting.   Genitourinary:  Negative for difficulty urinating.   Musculoskeletal:  Negative for arthralgias and joint swelling.   Skin:  Negative for color change.   Neurological:  Negative for dizziness, speech difficulty, light-headedness and headaches.   Psychiatric/Behavioral:  Negative for confusion and sleep disturbance.         Physical Exam  Constitutional:       General: She is awake.      Appearance: Normal appearance.   HENT:      Head: Normocephalic and atraumatic.      Nose: Nose normal.      Mouth/Throat:      Mouth: Mucous membranes are moist.   Eyes:      Pupils: Pupils are equal, round, and reactive to light.   Neck:      Thyroid: No  "thyroid mass.      Trachea: Phonation normal.   Cardiovascular:      Rate and Rhythm: Normal rate and regular rhythm.      Heart sounds: Normal heart sounds. No murmur heard.     No gallop.   Pulmonary:      Effort: Pulmonary effort is normal. No respiratory distress.      Breath sounds: Normal air entry. No decreased breath sounds, wheezing, rhonchi or rales.   Abdominal:      General: Bowel sounds are normal. There is no distension.      Palpations: Abdomen is soft.      Tenderness: There is no abdominal tenderness.   Musculoskeletal:      Cervical back: Neck supple.      Right lower leg: No edema.      Left lower leg: No edema.   Skin:     General: Skin is warm.      Capillary Refill: Capillary refill takes less than 2 seconds.   Neurological:      General: No focal deficit present.      Mental Status: She is alert and oriented to person, place, and time. Mental status is at baseline.      Cranial Nerves: Cranial nerves 2-12 are intact.      Motor: Motor function is intact.   Psychiatric:         Attention and Perception: Attention and perception normal.         Mood and Affect: Mood normal.         Speech: Speech normal.         Behavior: Behavior normal.          Last Recorded Vitals  Blood pressure 120/83, pulse 93, temperature 36.6 °C (97.9 °F), temperature source Temporal, resp. rate 18, height 1.549 m (5' 1\"), weight 58.5 kg (129 lb), SpO2 96%.    Assessment/Plan   Colon cancer screening  Colonoscopy      Rduy Otto MD  "

## 2024-09-20 NOTE — ANESTHESIA POSTPROCEDURE EVALUATION
Patient: Dulce Maria Beltre    Procedure Summary       Date: 09/20/24 Room / Location: [facility]    Anesthesia Start: 0809 Anesthesia Stop: 0831    Procedure: COLONOSCOPY Diagnosis:       Colon cancer screening      Colon cancer screening    Scheduled Providers: Rudy Otto MD Responsible Provider: ADARSH Padgett    Anesthesia Type: MAC ASA Status: 2            Anesthesia Type: MAC    Vitals Value Taken Time   /83 09/20/24 0830   Temp 36.6 °C (97.9 °F) 09/20/24 0830   Pulse 90 09/20/24 0830   Resp 15 09/20/24 0830   SpO2 97 % 09/20/24 0830       Anesthesia Post Evaluation    Patient location during evaluation: PACU  Patient participation: waiting for patient participation  Level of consciousness: responsive to verbal stimuli  Pain score: 0  Pain management: adequate  Airway patency: patent  Cardiovascular status: blood pressure returned to baseline  Respiratory status: acceptable  Hydration status: acceptable  Postoperative Nausea and Vomiting: none    No notable events documented.

## 2024-09-20 NOTE — ANESTHESIA PREPROCEDURE EVALUATION
Patient: Dulce Maria Beltre    Procedure Information       Anesthesia Start Date/Time: 09/20/24 0809    Scheduled providers: Rudy Otto MD    Procedure: COLONOSCOPY    Location: [facility]            Relevant Problems   Neuro   (+) Major depression       Clinical information reviewed:   Tobacco  Allergies  Meds   Med Hx  Surg Hx  OB Status  Fam Hx  Soc   Hx        NPO Detail:  NPO/Void Status  Date of Last Liquid: 09/20/24  Time of Last Liquid: 0300  Date of Last Solid: 09/18/24  Time of Last Solid: 1800         Physical Exam    Airway  Mallampati: II  TM distance: >3 FB  Neck ROM: full     Cardiovascular - normal exam     Dental - normal exam     Pulmonary - normal exam     Abdominal - normal exam         Anesthesia Plan    History of general anesthesia?: no  History of complications of general anesthesia?: no    ASA 2     MAC     The patient is a current smoker.  Patient did not smoke on day of procedure.    intravenous induction   Anesthetic plan and risks discussed with patient.

## 2024-10-17 ENCOUNTER — OFFICE VISIT (OUTPATIENT)
Dept: PRIMARY CARE | Facility: CLINIC | Age: 54
End: 2024-10-17
Payer: COMMERCIAL

## 2024-10-17 VITALS
OXYGEN SATURATION: 96 % | SYSTOLIC BLOOD PRESSURE: 136 MMHG | WEIGHT: 129 LBS | TEMPERATURE: 97.1 F | HEART RATE: 87 BPM | RESPIRATION RATE: 16 BRPM | DIASTOLIC BLOOD PRESSURE: 80 MMHG | BODY MASS INDEX: 24.37 KG/M2

## 2024-10-17 DIAGNOSIS — F32.9 MAJOR DEPRESSIVE DISORDER, REMISSION STATUS UNSPECIFIED, UNSPECIFIED WHETHER RECURRENT: Primary | ICD-10-CM

## 2024-10-17 DIAGNOSIS — R51.9 NONINTRACTABLE HEADACHE, UNSPECIFIED CHRONICITY PATTERN, UNSPECIFIED HEADACHE TYPE: ICD-10-CM

## 2024-10-17 DIAGNOSIS — F32.9 MAJOR DEPRESSIVE DISORDER, REMISSION STATUS UNSPECIFIED, UNSPECIFIED WHETHER RECURRENT: ICD-10-CM

## 2024-10-17 PROCEDURE — 99213 OFFICE O/P EST LOW 20 MIN: CPT | Performed by: FAMILY MEDICINE

## 2024-10-17 RX ORDER — BUPROPION HYDROCHLORIDE 300 MG/1
TABLET ORAL
Refills: 0 | OUTPATIENT
Start: 2024-10-17

## 2024-10-17 RX ORDER — BUPROPION HYDROCHLORIDE 150 MG/1
TABLET, EXTENDED RELEASE ORAL
Qty: 270 TABLET | Refills: 1 | Status: SHIPPED | OUTPATIENT
Start: 2024-10-17

## 2024-10-17 RX ORDER — BUPROPION HYDROCHLORIDE 150 MG/1
150 TABLET, EXTENDED RELEASE ORAL 2 TIMES DAILY
Qty: 180 TABLET | Refills: 1 | Status: SHIPPED | OUTPATIENT
Start: 2024-10-17 | End: 2024-10-17

## 2024-10-17 NOTE — PROGRESS NOTES
Subjective   Patient ID: Dulce Maria Beltre is a 54 y.o. female who presents for Follow-up (6 mo med fu. No questions, concerns, or complaints. //).    HPI  Patient presents today for 6-month checkup and refill of meds. She currently is without complaints. She states that she is taking her medicines as directed and is not having any side effects from them.    Patient claims she is having headaches 2-3 times a week on a consistent basis.  This is despite being on the Wellbutrin 300 mg a day.  She does usually get rid of them with something as simple as Tylenol with Anaprox.  We will try increasing her Wellbutrin to 450 mg daily and see if we can get rid of her headaches or decrease him significantly.  She is she is to keep a headache log and contact me in a month with an update.  She claims she is losing her health insurance at the end of October as her employer cannot decide what health insurance to go with.    Patient tells me she has been evaluated by hematology and has a recessive gene for hemochromatosis.    Patient had colonoscopy last month and is good for 10 years.    Review of Systems   All other systems reviewed and are negative.      Objective   Vitals:  /80   Pulse 87   Temp 36.2 °C (97.1 °F)   Resp 16   Wt 58.5 kg (129 lb)   LMP  (LMP Unknown)   SpO2 96%   BMI 24.37 kg/m²     Physical Exam  Vitals reviewed.   Constitutional:       Appearance: She is well-developed.   HENT:      Head: Normocephalic and atraumatic.      Right Ear: Tympanic membrane, ear canal and external ear normal.      Left Ear: Tympanic membrane, ear canal and external ear normal.      Nose: Nose normal.      Mouth/Throat:      Mouth: Mucous membranes are moist.      Pharynx: Oropharynx is clear.   Eyes:      Extraocular Movements: Extraocular movements intact.      Conjunctiva/sclera: Conjunctivae normal.      Pupils: Pupils are equal, round, and reactive to light.   Cardiovascular:      Rate and Rhythm: Normal rate and  regular rhythm.      Heart sounds: Normal heart sounds. No murmur heard.  Pulmonary:      Effort: Pulmonary effort is normal.      Breath sounds: Normal breath sounds. No wheezing.   Abdominal:      General: Abdomen is flat. Bowel sounds are normal.      Palpations: Abdomen is soft.   Musculoskeletal:         General: Normal range of motion.   Skin:     General: Skin is warm and dry.   Neurological:      General: No focal deficit present.      Mental Status: She is alert and oriented to person, place, and time. Mental status is at baseline.   Psychiatric:         Mood and Affect: Mood normal.         Behavior: Behavior normal.         Thought Content: Thought content normal.         Judgment: Judgment normal.       Assessment/Plan   Problem List Items Addressed This Visit       Major depression - Primary    Relevant Medications    buPROPion SR (Wellbutrin SR) 150 mg 12 hr tablet     Other Visit Diagnoses       Nonintractable headache, unspecified chronicity pattern, unspecified headache type            Patient to contact me in 1 month with an update on her headaches.  Sooner if she is not tolerating the increased dose of Wellbutrin.  Continue current meds as directed.  Follow up in 6 months for recheck if all remains stable, sooner if problems arise.  Medication list reconciled.  Thank you for visiting today!      Professional services: Some of this note was completed using Dragon voice technology and sometimes the software misinterprets words. This may include unintended errors with respect to translation of words, typographical errors or grammar errors which may not have been identified prior to finalization of the chart note. Please take this into account when reading the note. Thank you.       Pito Lindsay DO 10/18/24 7:08 AM

## 2024-11-16 DIAGNOSIS — F32.9 MAJOR DEPRESSIVE DISORDER, REMISSION STATUS UNSPECIFIED, UNSPECIFIED WHETHER RECURRENT: ICD-10-CM

## 2024-11-18 RX ORDER — BUPROPION HYDROCHLORIDE 150 MG/1
TABLET, EXTENDED RELEASE ORAL
Qty: 60 TABLET | OUTPATIENT
Start: 2024-11-18

## 2024-11-20 ENCOUNTER — TELEMEDICINE (OUTPATIENT)
Dept: PRIMARY CARE | Facility: CLINIC | Age: 54
End: 2024-11-20

## 2024-11-20 DIAGNOSIS — F32.9 MAJOR DEPRESSIVE DISORDER, REMISSION STATUS UNSPECIFIED, UNSPECIFIED WHETHER RECURRENT: ICD-10-CM

## 2024-11-20 DIAGNOSIS — B02.9 HERPES ZOSTER WITHOUT COMPLICATION: Primary | ICD-10-CM

## 2024-11-20 PROCEDURE — 99213 OFFICE O/P EST LOW 20 MIN: CPT | Performed by: FAMILY MEDICINE

## 2024-11-20 RX ORDER — VALACYCLOVIR HYDROCHLORIDE 1 G/1
1000 TABLET, FILM COATED ORAL 3 TIMES DAILY
Qty: 21 TABLET | Refills: 0 | Status: SHIPPED | OUTPATIENT
Start: 2024-11-20 | End: 2024-11-27

## 2024-11-20 RX ORDER — BUPROPION HYDROCHLORIDE 150 MG/1
TABLET, EXTENDED RELEASE ORAL
Qty: 180 TABLET | Refills: 1 | Status: SHIPPED | OUTPATIENT
Start: 2024-11-20

## 2024-11-20 NOTE — PROGRESS NOTES
"Subjective   Patient ID: Dulce Maria Beltre is a 54 y.o. female who presents for Follow-up (Med fu. She had doubled the Wellbutrin at night but it did not help with her headaches. She is back to taking 1 in the morning and one at night. The headaches went away and she feels it was weather related. ).    HPI  Patient is seen today as a telemedicine visit rendered via realtime interactive audio/video [email or web address] services as we are currently in the middle of a coronavirus pandemic worldwide. The patient was informed about the telehealth clinical encounter including benefits to avoiding travel and limitations to the assessment. In person care may be recommended if needed. Telehealth sessions are not being recorded and personal health information is protected.    Patient presents today for follow-up on her medicines.  She states the doubling of the Wellbutrin at bedtime did not help and in fact caused her to feel \"comatose the next day\".  She states that her headaches are now gone and maybe they were weather related.  She has cut back the Wellbutrin to 1 pill twice a day and is doing okay now.    She has developed a rash on the left below the rib cage.  It is a painful and slightly pruritic rash consistent with shingles.    Review of Systems   All other systems reviewed and are negative.      Objective   Vitals:  LMP  (LMP Unknown)     Physical Exam    Assessment/Plan   Problem List Items Addressed This Visit       Major depression    Relevant Medications    buPROPion SR (Wellbutrin SR) 150 mg 12 hr tablet     Other Visit Diagnoses       Herpes zoster without complication    -  Primary    Relevant Medications    valACYclovir (Valtrex) 1 gram tablet        Continue current meds as directed.  Follow up in 2 weeks for recheck if all remains stable, sooner if problems arise.  Medication list reconciled.  Due to the novel SARS-CoV-2 outbreak causing the corona virus disease of 2019 (nicknamed COVID-19) which has been declared a " pandemic, we have accommodated this patient to be evaluated via telemedicine. All documentation here reflects my evaluation as well as our plan for today's visit.       Professional services: Some of this note was completed using Dragon voice technology and sometimes the software misinterprets words. This may include unintended errors with respect to translation of words, typographical errors or grammar errors which may not have been identified prior to finalization of the chart note. Please take this into account when reading the note. Thank you.       Pito Lindsay DO 11/21/24 7:04 AM

## 2025-03-27 ENCOUNTER — OFFICE VISIT (OUTPATIENT)
Dept: PRIMARY CARE | Facility: CLINIC | Age: 55
End: 2025-03-27

## 2025-03-27 VITALS
WEIGHT: 129 LBS | HEART RATE: 90 BPM | TEMPERATURE: 97.8 F | BODY MASS INDEX: 24.37 KG/M2 | DIASTOLIC BLOOD PRESSURE: 73 MMHG | RESPIRATION RATE: 16 BRPM | SYSTOLIC BLOOD PRESSURE: 115 MMHG | OXYGEN SATURATION: 95 %

## 2025-03-27 DIAGNOSIS — K57.92 DIVERTICULITIS: Primary | ICD-10-CM

## 2025-03-27 PROCEDURE — 99213 OFFICE O/P EST LOW 20 MIN: CPT | Performed by: FAMILY MEDICINE

## 2025-03-27 RX ORDER — METRONIDAZOLE 500 MG/1
500 TABLET ORAL 3 TIMES DAILY
Qty: 21 TABLET | Refills: 0 | Status: SHIPPED | OUTPATIENT
Start: 2025-03-27 | End: 2025-04-03

## 2025-03-27 ASSESSMENT — PATIENT HEALTH QUESTIONNAIRE - PHQ9
SUM OF ALL RESPONSES TO PHQ9 QUESTIONS 1 AND 2: 0
1. LITTLE INTEREST OR PLEASURE IN DOING THINGS: NOT AT ALL
2. FEELING DOWN, DEPRESSED OR HOPELESS: NOT AT ALL

## 2025-03-27 ASSESSMENT — ENCOUNTER SYMPTOMS
OCCASIONAL FEELINGS OF UNSTEADINESS: 0
LOSS OF SENSATION IN FEET: 0
DEPRESSION: 0

## 2025-03-27 NOTE — PROGRESS NOTES
"Subjective   Patient ID: Dulce Maria Beltre is a 55 y.o. female who presents for GI Problem (X 3 weeks started with some cramping, bloating, gas and diarrhea. Friday there was blood in the stool and that lasted intermittently for a few days. /The only thing that as ne in the diet she had a salad with raw veggies. /She also used to be able to tolerate onions and now cannot, and that was 2 weeks ago she found that out. ).    HPI  Patient comes in today she has been having intermittent abdominal pains for the past 3 weeks.  She describes cramping and bloating and diarrhea along with mucus and blood in the stools.  She has not had any consistent diarrhea for several days.  It is more mucus and blood and \"shredding\" stools.    In looking back at her recent colonoscopy results from September 2024 she did have moderate diverticulosis of the sigmoid colon as well as medium hemorrhoids.    Review of Systems   All other systems reviewed and are negative.      Objective   Vitals:  /73   Pulse 90   Temp 36.6 °C (97.8 °F)   Resp 16   Wt 58.5 kg (129 lb)   LMP  (LMP Unknown)   SpO2 95%   BMI 24.37 kg/m²     Physical Exam  Vitals reviewed.   Constitutional:       Appearance: She is well-developed.   HENT:      Head: Normocephalic and atraumatic.      Right Ear: Tympanic membrane, ear canal and external ear normal.      Left Ear: Tympanic membrane, ear canal and external ear normal.      Nose: Nose normal.      Mouth/Throat:      Mouth: Mucous membranes are moist.      Pharynx: Oropharynx is clear.   Eyes:      Extraocular Movements: Extraocular movements intact.      Conjunctiva/sclera: Conjunctivae normal.      Pupils: Pupils are equal, round, and reactive to light.   Cardiovascular:      Rate and Rhythm: Normal rate and regular rhythm.      Heart sounds: Normal heart sounds. No murmur heard.  Pulmonary:      Effort: Pulmonary effort is normal.      Breath sounds: Normal breath sounds. No wheezing.   Abdominal:      " General: Abdomen is flat. Bowel sounds are normal.      Palpations: Abdomen is soft.   Musculoskeletal:         General: Normal range of motion.   Skin:     General: Skin is warm and dry.   Neurological:      General: No focal deficit present.      Mental Status: She is alert and oriented to person, place, and time. Mental status is at baseline.   Psychiatric:         Mood and Affect: Mood normal.         Behavior: Behavior normal.         Thought Content: Thought content normal.         Judgment: Judgment normal.       CBC -  Recent Labs     09/03/24  1439 05/10/24  0922 04/30/23  0626   WBC 6.5 4.7 5.0   HGB 14.3 14.7 13.9   HCT 42.3 43.2 41.4    259 241   MCV 96 94 95       CMP -  Recent Labs     09/03/24  1439 05/10/24  0922 04/30/23  0626    140 138   K 4.0 4.3 4.0    104 107   CO2 29 29 26   ANIONGAP 11 11 9*   BUN 13 14 14   CREATININE 0.89 0.91 0.92   EGFR 77 75  --      Recent Labs     09/03/24  1439 05/10/24  0922 04/30/23  0626   ALBUMIN 4.5 4.4 4.2   ALKPHOS 96 97 81   ALT 14 13 11   AST 14 14 15   BILITOT 0.3 0.6 0.6       LIPID PANEL -   Recent Labs     05/10/24  0922 03/17/23  1050 04/28/22  1021   CHOL 184 184 190   LDLCALC 105*  --   --    LDLF  --  110* 114*   HDL 64.0 61.0 61.0   TRIG 73 64 74       Recent Labs     05/10/24  0922   HGBA1C 4.9       Assessment/Plan   Problem List Items Addressed This Visit    None  Visit Diagnoses       Diverticulitis    -  Primary    Relevant Medications    metroNIDAZOLE (Flagyl) 500 mg tablet        Use meds as directed.  Return to clinic if symptoms do not improve in 7-10 days.    Should the condition worsen contact me and return here or if after hours seek further evaluation at an urgent care or in the emergency room.  Medication list reconciled.  Thank you for visiting today!      Professional services: Some of this note was completed using Dragon voice technology and sometimes the software misinterprets words. This may include unintended  errors with respect to translation of words, typographical errors or grammar errors which may not have been identified prior to finalization of the chart note. Please take this into account when reading the note. Thank you.       Pito Lindsay DO 03/27/25 11:55 AM

## 2025-04-28 NOTE — DISCHARGE INSTRUCTIONS

## 2025-06-10 DIAGNOSIS — F32.9 MAJOR DEPRESSIVE DISORDER, REMISSION STATUS UNSPECIFIED, UNSPECIFIED WHETHER RECURRENT: ICD-10-CM

## 2025-06-10 RX ORDER — BUPROPION HYDROCHLORIDE 150 MG/1
TABLET, EXTENDED RELEASE ORAL
Qty: 180 TABLET | Refills: 1 | Status: SHIPPED | OUTPATIENT
Start: 2025-06-10

## 2025-06-10 NOTE — TELEPHONE ENCOUNTER
Pt last OV 3/27/2025  Requested Prescriptions     Pending Prescriptions Disp Refills    buPROPion SR (Wellbutrin SR) 150 mg 12 hr tablet [Pharmacy Med Name: BUPROPION HCL  MG TABLET] 180 tablet 1     Sig: TAKE 1 TABLET BY MOUTH EVERY AM AND 1 TAB EVERY PM DO NOT CRUSH, CHEW, OR SPLIT.

## 2025-07-10 ENCOUNTER — APPOINTMENT (OUTPATIENT)
Dept: PRIMARY CARE | Facility: CLINIC | Age: 55
End: 2025-07-10

## 2025-07-10 VITALS
SYSTOLIC BLOOD PRESSURE: 120 MMHG | DIASTOLIC BLOOD PRESSURE: 76 MMHG | HEART RATE: 90 BPM | TEMPERATURE: 97.9 F | OXYGEN SATURATION: 95 % | BODY MASS INDEX: 24.3 KG/M2 | RESPIRATION RATE: 16 BRPM | WEIGHT: 128.6 LBS

## 2025-07-10 DIAGNOSIS — L40.1 PUSTULAR PSORIASIS: ICD-10-CM

## 2025-07-10 DIAGNOSIS — F41.9 ANXIETY DISORDER, UNSPECIFIED TYPE: Primary | ICD-10-CM

## 2025-07-10 DIAGNOSIS — F32.9 MAJOR DEPRESSIVE DISORDER, REMISSION STATUS UNSPECIFIED, UNSPECIFIED WHETHER RECURRENT: ICD-10-CM

## 2025-07-10 PROCEDURE — 99213 OFFICE O/P EST LOW 20 MIN: CPT | Performed by: FAMILY MEDICINE

## 2025-07-10 RX ORDER — BUPROPION HYDROCHLORIDE 150 MG/1
TABLET, EXTENDED RELEASE ORAL
Qty: 180 TABLET | Refills: 1 | Status: SHIPPED | OUTPATIENT
Start: 2025-07-10

## 2025-07-10 RX ORDER — BUSPIRONE HYDROCHLORIDE 5 MG/1
TABLET ORAL
Qty: 90 TABLET | Refills: 1 | Status: SHIPPED | OUTPATIENT
Start: 2025-07-10

## 2025-07-10 ASSESSMENT — ENCOUNTER SYMPTOMS
LOSS OF SENSATION IN FEET: 0
DEPRESSION: 0
OCCASIONAL FEELINGS OF UNSTEADINESS: 0

## 2025-07-10 ASSESSMENT — PATIENT HEALTH QUESTIONNAIRE - PHQ9
1. LITTLE INTEREST OR PLEASURE IN DOING THINGS: NOT AT ALL
2. FEELING DOWN, DEPRESSED OR HOPELESS: NOT AT ALL
SUM OF ALL RESPONSES TO PHQ9 QUESTIONS 1 AND 2: 0

## 2025-07-10 NOTE — PROGRESS NOTES
Subjective   Patient ID: Dulce Maria Beltre is a 55 y.o. female who presents for Follow-up (6 mo med fu. Would like to get an Rx restarted for buspar 10mg. She was on in the past and currently dealing with some anxiety. /).    HPI  Patient presents today for 6-month checkup and refill of meds.  She states that she is taking her medicines as directed and is not having any side effects from them.     She currently is with complaint of anxiety.  She has had this in the past and actually was on BuSpar at one point which worked very well for her.  She would like to try it again.     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    Review of Systems   All other systems reviewed and are negative.      Objective   Vitals:  /76   Pulse 90   Temp 36.6 °C (97.9 °F)   Resp 16   Wt 58.3 kg (128 lb 9.6 oz)   LMP  (LMP Unknown)   SpO2 95%   BMI 24.30 kg/m²     Physical Exam  Vitals reviewed.   Constitutional:       Appearance: She is well-developed.   HENT:      Head: Normocephalic and atraumatic.      Right Ear: Tympanic membrane, ear canal and external ear normal.      Left Ear: Tympanic membrane, ear canal and external ear normal.      Nose: Nose normal.      Mouth/Throat:      Mouth: Mucous membranes are moist.      Pharynx: Oropharynx is clear.   Eyes:      Extraocular Movements: Extraocular movements intact.      Conjunctiva/sclera: Conjunctivae normal.      Pupils: Pupils are equal, round, and reactive to light.   Cardiovascular:      Rate and Rhythm: Normal rate and regular rhythm.      Heart sounds: Normal heart sounds. No murmur heard.  Pulmonary:      Effort: Pulmonary effort is normal.      Breath sounds: Normal breath sounds. No wheezing.   Abdominal:      General: Abdomen is flat. Bowel sounds are normal.       Palpations: Abdomen is soft.   Musculoskeletal:         General: Normal range of motion.   Skin:     General: Skin is warm and dry.      Findings: Rash (Rash present on both hands) present.   Neurological:      General: No focal deficit present.      Mental Status: She is alert and oriented to person, place, and time. Mental status is at baseline.   Psychiatric:         Mood and Affect: Mood normal.         Behavior: Behavior normal.         Thought Content: Thought content normal.         Judgment: Judgment normal.         CBC -  Recent Labs     09/03/24  1439 05/10/24  0922 04/30/23  0626   WBC 6.5 4.7 5.0   HGB 14.3 14.7 13.9   HCT 42.3 43.2 41.4    259 241   MCV 96 94 95       CMP -  Recent Labs     09/03/24  1439 05/10/24  0922 04/30/23  0626    140 138   K 4.0 4.3 4.0    104 107   CO2 29 29 26   ANIONGAP 11 11 9*   BUN 13 14 14   CREATININE 0.89 0.91 0.92   EGFR 77 75  --      Recent Labs     09/03/24  1439 05/10/24  0922 04/30/23  0626   ALBUMIN 4.5 4.4 4.2   ALKPHOS 96 97 81   ALT 14 13 11   AST 14 14 15   BILITOT 0.3 0.6 0.6       LIPID PANEL -   Recent Labs     05/10/24  0922 03/17/23  1050 04/28/22  1021   CHOL 184 184 190   LDLCALC 105*  --   --    LDLF  --  110* 114*   HDL 64.0 61.0 61.0   TRIG 73 64 74       Recent Labs     05/10/24  0922   HGBA1C 4.9       Lab Results   Component Value Date    NTBCMSDH43 316 05/10/2024       Lab Results   Component Value Date    VITD25 48 05/10/2024        Assessment/Plan   Problem List Items Addressed This Visit       Major depression    Relevant Medications    buPROPion SR (Wellbutrin SR) 150 mg 12 hr tablet    busPIRone (Buspar) 5 mg tablet    Pustular psoriasis    Anxiety disorder - Primary    Relevant Medications    busPIRone (Buspar) 5 mg tablet   Continue current meds as directed.  Follow up in 6 months for recheck if all remains stable, sooner if problems arise.     Patient exhibiting no signs of depression and does not need treatment  at this time.  Medication list reconciled.  Thank you for visiting today!        Professional services: Some of this note was completed using Dragon voice technology and sometimes the software misinterprets words. This may include unintended errors with respect to translation of words, typographical errors or grammar errors which may not have been identified prior to finalization of the chart note. Please take this into account when reading the note. Thank you.              Pito Lindsay DO 07/10/25 3:20 PM